# Patient Record
Sex: FEMALE | Race: WHITE | Employment: OTHER | URBAN - METROPOLITAN AREA
[De-identification: names, ages, dates, MRNs, and addresses within clinical notes are randomized per-mention and may not be internally consistent; named-entity substitution may affect disease eponyms.]

---

## 2017-01-03 DIAGNOSIS — I47.1 PSVT (PAROXYSMAL SUPRAVENTRICULAR TACHYCARDIA) (HCC): ICD-10-CM

## 2017-01-03 DIAGNOSIS — I48.0 PAF (PAROXYSMAL ATRIAL FIBRILLATION) (HCC): Chronic | ICD-10-CM

## 2017-01-03 RX ORDER — FLECAINIDE ACETATE 50 MG/1
50 TABLET ORAL 2 TIMES DAILY
Qty: 180 TAB | Refills: 3 | Status: SHIPPED | OUTPATIENT
Start: 2017-01-03 | End: 2017-10-06 | Stop reason: SDUPTHER

## 2017-01-03 NOTE — TELEPHONE ENCOUNTER
Requested Prescriptions     Signed Prescriptions Disp Refills    flecainide (TAMBOCOR) 50 mg tablet 180 Tab 3     Sig: Take 1 Tab by mouth two (2) times a day.      Authorizing Provider: Jaynie Schaumann     Ordering User: Jamil Miranda

## 2017-01-18 ENCOUNTER — OFFICE VISIT (OUTPATIENT)
Dept: CARDIOLOGY CLINIC | Age: 77
End: 2017-01-18

## 2017-01-18 DIAGNOSIS — Z95.818 STATUS POST PLACEMENT OF IMPLANTABLE LOOP RECORDER: Primary | ICD-10-CM

## 2017-02-13 RX ORDER — LABETALOL 200 MG/1
TABLET, FILM COATED ORAL
Qty: 180 TAB | Refills: 3 | Status: SHIPPED | OUTPATIENT
Start: 2017-02-13 | End: 2017-11-22 | Stop reason: SDUPTHER

## 2017-03-17 ENCOUNTER — OFFICE VISIT (OUTPATIENT)
Dept: CARDIOLOGY CLINIC | Age: 77
End: 2017-03-17

## 2017-03-17 DIAGNOSIS — Z95.818 STATUS POST PLACEMENT OF IMPLANTABLE LOOP RECORDER: Primary | ICD-10-CM

## 2017-07-17 ENCOUNTER — OFFICE VISIT (OUTPATIENT)
Dept: CARDIOLOGY CLINIC | Age: 77
End: 2017-07-17

## 2017-07-17 DIAGNOSIS — Z95.818 STATUS POST PLACEMENT OF IMPLANTABLE LOOP RECORDER: Primary | ICD-10-CM

## 2017-09-15 ENCOUNTER — OFFICE VISIT (OUTPATIENT)
Dept: CARDIOLOGY CLINIC | Age: 77
End: 2017-09-15

## 2017-09-15 DIAGNOSIS — Z95.818 STATUS POST PLACEMENT OF IMPLANTABLE LOOP RECORDER: Primary | ICD-10-CM

## 2017-10-03 ENCOUNTER — OFFICE VISIT (OUTPATIENT)
Dept: CARDIOLOGY CLINIC | Age: 77
End: 2017-10-03

## 2017-10-03 DIAGNOSIS — Z95.818 STATUS POST PLACEMENT OF IMPLANTABLE LOOP RECORDER: Primary | ICD-10-CM

## 2017-10-04 ENCOUNTER — TELEPHONE (OUTPATIENT)
Dept: CARDIOLOGY CLINIC | Age: 77
End: 2017-10-04

## 2017-10-04 NOTE — TELEPHONE ENCOUNTER
Received a [facility] report for her Linq monitor. On 10/1/17 8:24 pm pt had a tachy episode that lasted 23 sec and was received on 10/3/17 on the [facility] system. It was non sustained atrial tach with rates up to 162 bpm.  Report given to Dr Moraima Moreland to review. He wants to know if pt was having any symptoms and activity at the time. Called pt & left message to call me back regarding this issue.

## 2017-10-04 NOTE — TELEPHONE ENCOUNTER
Spoke to pt  Richa Felix. He stated she has dementia and doesn't do too much activity. He has a hard time getting her to take her meds. He has been crushing it & putting in ice cream but she doesn't always take it. He also feels she is not drinking enough. Informed him to Dr Henley Like wants him to keep trying to work on her taking her meds. We will monitor her Linq. Will send message to Katie Villatoro our NN to see if any type of help is available for him since he is paying out of pocket for a lady to come to house but she cant assist with meds, etc.  He can be reached on his cell [phone].

## 2017-10-06 ENCOUNTER — PATIENT OUTREACH (OUTPATIENT)
Dept: CARDIOLOGY CLINIC | Age: 77
End: 2017-10-06

## 2017-10-06 DIAGNOSIS — I48.0 PAF (PAROXYSMAL ATRIAL FIBRILLATION) (HCC): Chronic | ICD-10-CM

## 2017-10-06 DIAGNOSIS — I47.1 PSVT (PAROXYSMAL SUPRAVENTRICULAR TACHYCARDIA) (HCC): ICD-10-CM

## 2017-10-06 RX ORDER — FLECAINIDE ACETATE 50 MG/1
50 TABLET ORAL 2 TIMES DAILY
Qty: 180 TAB | Refills: 3 | Status: SHIPPED | OUTPATIENT
Start: 2017-10-06

## 2017-10-06 NOTE — TELEPHONE ENCOUNTER
Requested Prescriptions     Signed Prescriptions Disp Refills    flecainide (TAMBOCOR) 50 mg tablet 180 Tab 3     Sig: Take 1 Tab by mouth two (2) times a day.      Authorizing Provider: Elise Martinez     Ordering User: Violet Sanders

## 2017-10-06 NOTE — PROGRESS NOTES
NN Note:    Called and spoke to Mrs. Okeefe's , Mr. Mechelle Montez. He states his wife suffers from Dementia. He has a caregiver that comes to their house 3 times per week for half a day which gives him a good break. He declined any agencies information for the time being- he stated he was okay for now but knows the next step would be to place her in a nursing home which he is prepared to do when the time comes. Some days he has trouble getting Mrs. Okeefe to take her medications- he alternates between puddings, jello, and ice cream and crushes all of her medications. He is going to try apple sauce this weekend to see if that help- its not the texture that she doesn't like- he says he crushes them very finely but it's the taste that gets to her. Offered support and encouragement to Mr. Mechelle Montez to continue to do what he was doing- encouraged him that he is doing all that he can and she is nick to have such a wonderful . Gave Mr. Mechelle Montez my information and told him to contact me at any time. Mr. Mechelle Montez was appreciative of the call and agreed to do so.

## 2017-10-25 ENCOUNTER — OFFICE VISIT (OUTPATIENT)
Dept: CARDIOLOGY CLINIC | Age: 77
End: 2017-10-25

## 2017-10-25 DIAGNOSIS — Z95.818 STATUS POST PLACEMENT OF IMPLANTABLE LOOP RECORDER: Primary | ICD-10-CM

## 2017-11-14 ENCOUNTER — OFFICE VISIT (OUTPATIENT)
Dept: CARDIOLOGY CLINIC | Age: 77
End: 2017-11-14

## 2017-11-14 DIAGNOSIS — Z95.818 STATUS POST PLACEMENT OF IMPLANTABLE LOOP RECORDER: Primary | ICD-10-CM

## 2017-11-22 RX ORDER — LABETALOL 200 MG/1
TABLET, FILM COATED ORAL
Qty: 180 TAB | Refills: 3 | Status: SHIPPED | OUTPATIENT
Start: 2017-11-22

## 2017-12-07 ENCOUNTER — OFFICE VISIT (OUTPATIENT)
Dept: CARDIOLOGY CLINIC | Age: 77
End: 2017-12-07

## 2017-12-07 DIAGNOSIS — Z95.818 STATUS POST PLACEMENT OF IMPLANTABLE LOOP RECORDER: Primary | ICD-10-CM

## 2017-12-13 ENCOUNTER — DOCUMENTATION ONLY (OUTPATIENT)
Dept: CARDIOLOGY CLINIC | Age: 77
End: 2017-12-13

## 2017-12-13 NOTE — PROGRESS NOTES
Linq alert triggered battery is SALOMÓN as of 12/6/17. Pt has an appt to see Dr Niharika Jiang on 1/24/18 & can discuss at that time.

## 2017-12-18 ENCOUNTER — TELEPHONE (OUTPATIENT)
Dept: CARDIOLOGY CLINIC | Age: 77
End: 2017-12-18

## 2017-12-18 NOTE — TELEPHONE ENCOUNTER
Spoke to pts  Richa Felix. He stated this am he got her up and to the table to sit for breakfast.  Once she was there it looked like she was asleep or going to pass out. Per  she looked like she was not really breathing & arm jerking. He called 911 & tried to take her BP/HR. He stated it was so low it didn't register on his machine. She is currently at [facility]. He needed to know what type of device she has. Explained she has a Linq by [employer]. Her device just triggered SALOMÓN for battery recently. Explained this to him but not sure if it had enough to record anything. Gave him Med ph# to have hosp call to see if it can be interr in person since we would not receive an alert if it is working until the following day if she is near her base [place]. Asked pts  to keep us updated since we can not see [facility] records. Explained that Dr Henley Like is gone this wk but will inform Cyndy Parkinson NP. He stated he understands.

## 2017-12-18 NOTE — TELEPHONE ENCOUNTER
Patient's  states that the patient is currently in the hospital in [place]. Details are needed about the implantable loop recorder. Requests a call back at [phone]. Thanks!

## 2017-12-28 ENCOUNTER — TELEPHONE (OUTPATIENT)
Dept: CARDIOLOGY CLINIC | Age: 77
End: 2017-12-28

## 2017-12-28 NOTE — TELEPHONE ENCOUNTER
Spoke to pts  regarding pts episode from 12/18/17. He stated that he took pt that am to the kitchen to sit in chair. \" Within a few seconds she looked like she was asleep but he couldn't arouse her. She then started to twitch like she was having a seizure. Pt taken by EMS to [facility]. They transferred her to [facility] for more testing but were unable to say for sure what happened\" per pts . She was in hosp for a wk. He stated she is now at [facility] due to needing 24 hr nursing care since event happened. They are waiting to start her PT right now as she is a high fall risk. She is able to talk & move but they are trying to keep her from getting up right now. Pt has a Linq that has reached EOL.  wanted to know what needs to be done. Explained it is fine to leave vs removal risk. He prefers leaving it in. He stated she has an appt to see Dr Ever Dupont on 1/24/18 and wanted to know if he should try to bring her in to appt. They live out of town and not sure how she will be at that time. Informed him I will call him tomorrow after speaking to Dr Ever Dupont.

## 2017-12-28 NOTE — TELEPHONE ENCOUNTER
Pt's  calling in regards to pt's monitor. Please give him a call back @ [phone]. Thanks!   Valentino Krause

## 2017-12-29 NOTE — TELEPHONE ENCOUNTER
Spoke to Dr Cole Talbert & he is fine cxl pts appt for now & if needed we can r/s if she starts having any problems. Pts  informed & stated he understands.

## 2018-01-15 ENCOUNTER — OFFICE VISIT (OUTPATIENT)
Dept: CARDIOLOGY CLINIC | Age: 78
End: 2018-01-15

## 2018-01-15 DIAGNOSIS — Z95.818 STATUS POST PLACEMENT OF IMPLANTABLE LOOP RECORDER: Primary | ICD-10-CM
